# Patient Record
Sex: FEMALE | Race: WHITE | NOT HISPANIC OR LATINO | Employment: FULL TIME | URBAN - METROPOLITAN AREA
[De-identification: names, ages, dates, MRNs, and addresses within clinical notes are randomized per-mention and may not be internally consistent; named-entity substitution may affect disease eponyms.]

---

## 2023-08-02 ENCOUNTER — HOSPITAL ENCOUNTER (OUTPATIENT)
Dept: DATA CONVERSION | Facility: HOSPITAL | Age: 63
End: 2023-08-02
Attending: STUDENT IN AN ORGANIZED HEALTH CARE EDUCATION/TRAINING PROGRAM | Admitting: STUDENT IN AN ORGANIZED HEALTH CARE EDUCATION/TRAINING PROGRAM
Payer: COMMERCIAL

## 2023-08-02 DIAGNOSIS — K57.30 DIVERTICULOSIS OF LARGE INTESTINE WITHOUT PERFORATION OR ABSCESS WITHOUT BLEEDING: ICD-10-CM

## 2023-08-02 DIAGNOSIS — K63.5 POLYP OF COLON: ICD-10-CM

## 2023-08-02 DIAGNOSIS — Z12.11 ENCOUNTER FOR SCREENING FOR MALIGNANT NEOPLASM OF COLON: ICD-10-CM

## 2023-08-02 LAB — POCT GLUCOSE: 115 MG/DL (ref 74–99)

## 2023-08-07 LAB
COMPLETE PATHOLOGY REPORT: NORMAL
CONVERTED CLINICAL DIAGNOSIS-HISTORY: NORMAL
CONVERTED FINAL DIAGNOSIS: NORMAL
CONVERTED FINAL REPORT PDF LINK TO COPY AND PASTE: NORMAL
CONVERTED GROSS DESCRIPTION: NORMAL

## 2023-09-29 VITALS — HEIGHT: 61 IN | BODY MASS INDEX: 24.56 KG/M2 | WEIGHT: 130.07 LBS

## 2023-11-05 DIAGNOSIS — I10 ESSENTIAL (PRIMARY) HYPERTENSION: ICD-10-CM

## 2023-11-06 RX ORDER — HYDROCHLOROTHIAZIDE 25 MG/1
25 TABLET ORAL DAILY
Qty: 90 TABLET | Refills: 3 | Status: SHIPPED | OUTPATIENT
Start: 2023-11-06

## 2023-11-10 LAB
NON-UH HIE A/G RATIO: 1.2
NON-UH HIE ALB: 4.1 G/DL (ref 3.4–5)
NON-UH HIE ALK PHOS: 54 UNIT/L (ref 45–117)
NON-UH HIE BASO COUNT: 0.05 X1000 (ref 0–0.2)
NON-UH HIE BASOS %: 0.8 %
NON-UH HIE BILIRUBIN, TOTAL: 0.4 MG/DL (ref 0.3–1.2)
NON-UH HIE BUN/CREAT RATIO: 14.2
NON-UH HIE BUN: 17 MG/DL (ref 9–23)
NON-UH HIE CALCIUM: 10.1 MG/DL (ref 8.7–10.4)
NON-UH HIE CALCULATED LDL CHOLESTEROL: 98 MG/DL (ref 60–130)
NON-UH HIE CALCULATED OSMOLALITY: 286 MOSM/KG (ref 275–295)
NON-UH HIE CHLORIDE: 103 MMOL/L (ref 98–107)
NON-UH HIE CHOLESTEROL: 192 MG/DL (ref 100–200)
NON-UH HIE CO2, VENOUS: 28 MMOL/L (ref 20–31)
NON-UH HIE CREATININE: 1.2 MG/DL (ref 0.5–0.8)
NON-UH HIE DIFF?: NO
NON-UH HIE EOS COUNT: 0.1 X1000 (ref 0–0.5)
NON-UH HIE EOSIN %: 1.5 %
NON-UH HIE GFR AA: 55
NON-UH HIE GLOBULIN: 3.5 G/DL
NON-UH HIE GLOMERULAR FILTRATION RATE: 46 ML/MIN/1.73M?
NON-UH HIE GLUCOSE: 113 MG/DL (ref 74–106)
NON-UH HIE GOT: 17 UNIT/L (ref 15–37)
NON-UH HIE GPT: 22 UNIT/L (ref 10–49)
NON-UH HIE HCT: 38.8 % (ref 36–46)
NON-UH HIE HDL CHOLESTEROL: 43 MG/DL (ref 40–60)
NON-UH HIE HGB A1C: 6.6 %
NON-UH HIE HGB: 13 G/DL (ref 12–16)
NON-UH HIE INSTR WBC: 6.7
NON-UH HIE K: 4 MMOL/L (ref 3.5–5.1)
NON-UH HIE LYMPH %: 41.4 %
NON-UH HIE LYMPH COUNT: 2.76 X1000 (ref 1.2–4.8)
NON-UH HIE MCH: 30.4 PG (ref 27–34)
NON-UH HIE MCHC: 33.6 G/DL (ref 32–37)
NON-UH HIE MCV: 90.6 FL (ref 80–100)
NON-UH HIE MONO %: 5.2 %
NON-UH HIE MONO COUNT: 0.34 X1000 (ref 0.1–1)
NON-UH HIE MPV: 6.8 FL (ref 7.4–10.4)
NON-UH HIE NA: 142 MMOL/L (ref 135–145)
NON-UH HIE NEUTROPHIL %: 51.2 %
NON-UH HIE NEUTROPHIL COUNT (ANC): 3.42 X1000 (ref 1.4–8.8)
NON-UH HIE NUCLEATED RBC: 0 /100WBC
NON-UH HIE PLATELET: 412 X10 (ref 150–450)
NON-UH HIE RBC: 4.28 X10 (ref 4.2–5.4)
NON-UH HIE RDW: 13.5 % (ref 11.5–14.5)
NON-UH HIE TOTAL CHOL/HDL CHOL RATIO: 4.5
NON-UH HIE TOTAL PROTEIN: 7.6 G/DL (ref 5.7–8.2)
NON-UH HIE TRIGLYCERIDES: 257 MG/DL (ref 30–150)
NON-UH HIE WBC: 6.7 X10 (ref 4.5–11)

## 2023-11-27 ENCOUNTER — OFFICE VISIT (OUTPATIENT)
Dept: PRIMARY CARE | Facility: CLINIC | Age: 63
End: 2023-11-27
Payer: COMMERCIAL

## 2023-11-27 VITALS
HEART RATE: 56 BPM | SYSTOLIC BLOOD PRESSURE: 139 MMHG | DIASTOLIC BLOOD PRESSURE: 76 MMHG | HEIGHT: 61 IN | TEMPERATURE: 96.8 F | WEIGHT: 136.9 LBS | BODY MASS INDEX: 25.85 KG/M2

## 2023-11-27 DIAGNOSIS — E11.65 TYPE 2 DIABETES MELLITUS WITH HYPERGLYCEMIA, WITHOUT LONG-TERM CURRENT USE OF INSULIN (MULTI): Primary | ICD-10-CM

## 2023-11-27 DIAGNOSIS — I10 BENIGN ESSENTIAL HYPERTENSION: ICD-10-CM

## 2023-11-27 PROBLEM — N20.0 RECURRENT NEPHROLITHIASIS: Status: ACTIVE | Noted: 2023-11-27

## 2023-11-27 PROBLEM — R92.8 ABNORMAL FINDING ON BREAST IMAGING: Status: ACTIVE | Noted: 2023-11-27

## 2023-11-27 PROBLEM — D75.839 THROMBOCYTOSIS: Status: ACTIVE | Noted: 2023-11-27

## 2023-11-27 PROBLEM — U07.1 DISEASE DUE TO SEVERE ACUTE RESPIRATORY SYNDROME CORONAVIRUS 2 (SARS-COV-2): Status: RESOLVED | Noted: 2023-11-27 | Resolved: 2023-11-27

## 2023-11-27 PROBLEM — I73.00 RAYNAUD PHENOMENON: Status: ACTIVE | Noted: 2023-11-27

## 2023-11-27 PROBLEM — R73.01 IMPAIRED FASTING GLUCOSE: Status: ACTIVE | Noted: 2023-11-27

## 2023-11-27 PROBLEM — R20.2 PARESTHESIA OF BOTH HANDS: Status: ACTIVE | Noted: 2023-11-27

## 2023-11-27 PROBLEM — K63.5 POLYP OF COLON: Status: ACTIVE | Noted: 2023-08-02

## 2023-11-27 PROBLEM — U07.1 DISEASE DUE TO SEVERE ACUTE RESPIRATORY SYNDROME CORONAVIRUS 2 (SARS-COV-2): Status: ACTIVE | Noted: 2023-11-27

## 2023-11-27 PROBLEM — H91.90 HEARING LOSS: Status: ACTIVE | Noted: 2023-11-27

## 2023-11-27 PROBLEM — M85.80 OSTEOPENIA: Status: ACTIVE | Noted: 2017-05-09

## 2023-11-27 PROBLEM — R73.03 PREDIABETES: Chronic | Status: ACTIVE | Noted: 2017-05-09

## 2023-11-27 PROBLEM — R03.0 ELEVATED BLOOD PRESSURE READING WITHOUT DIAGNOSIS OF HYPERTENSION: Status: ACTIVE | Noted: 2023-11-27

## 2023-11-27 PROBLEM — R68.89 HEAT INTOLERANCE: Status: ACTIVE | Noted: 2023-11-27

## 2023-11-27 PROBLEM — K57.30 DIVERTICULOSIS OF LARGE INTESTINE: Status: ACTIVE | Noted: 2023-08-02

## 2023-11-27 PROCEDURE — 90471 IMMUNIZATION ADMIN: CPT | Performed by: INTERNAL MEDICINE

## 2023-11-27 PROCEDURE — 3075F SYST BP GE 130 - 139MM HG: CPT | Performed by: INTERNAL MEDICINE

## 2023-11-27 PROCEDURE — 99213 OFFICE O/P EST LOW 20 MIN: CPT | Performed by: INTERNAL MEDICINE

## 2023-11-27 PROCEDURE — 3078F DIAST BP <80 MM HG: CPT | Performed by: INTERNAL MEDICINE

## 2023-11-27 PROCEDURE — 90686 IIV4 VACC NO PRSV 0.5 ML IM: CPT | Performed by: INTERNAL MEDICINE

## 2023-11-27 PROCEDURE — 1036F TOBACCO NON-USER: CPT | Performed by: INTERNAL MEDICINE

## 2023-11-27 RX ORDER — BLOOD SUGAR DIAGNOSTIC
STRIP MISCELLANEOUS
Qty: 100 STRIP | Refills: 3 | Status: SHIPPED | OUTPATIENT
Start: 2023-11-27

## 2023-11-27 RX ORDER — METFORMIN HYDROCHLORIDE 1000 MG/1
1000 TABLET ORAL
Qty: 180 TABLET | Refills: 3 | Status: SHIPPED | OUTPATIENT
Start: 2023-11-27 | End: 2024-11-26

## 2023-11-27 RX ORDER — DEXTROSE 4 G
TABLET,CHEWABLE ORAL
Qty: 1 EACH | Refills: 0 | Status: SHIPPED | OUTPATIENT
Start: 2023-11-27

## 2023-11-27 RX ORDER — MULTIVITAMIN
1 TABLET ORAL
COMMUNITY
Start: 2010-09-09

## 2023-11-27 RX ORDER — METFORMIN HYDROCHLORIDE 500 MG/1
500 TABLET ORAL
COMMUNITY
Start: 2018-03-12 | End: 2023-11-27 | Stop reason: SDUPTHER

## 2023-11-27 RX ORDER — LANCETS 33 GAUGE
EACH MISCELLANEOUS
Qty: 100 EACH | Refills: 3 | Status: SHIPPED | OUTPATIENT
Start: 2023-11-27

## 2023-11-27 RX ORDER — GUSELKUMAB 100 MG/ML
100 INJECTION SUBCUTANEOUS
COMMUNITY
Start: 2021-01-07

## 2023-11-27 RX ORDER — LOVASTATIN 40 MG/1
40 TABLET ORAL
COMMUNITY
Start: 2020-01-13 | End: 2024-01-10

## 2023-11-27 ASSESSMENT — PATIENT HEALTH QUESTIONNAIRE - PHQ9
1. LITTLE INTEREST OR PLEASURE IN DOING THINGS: NOT AT ALL
2. FEELING DOWN, DEPRESSED OR HOPELESS: NOT AT ALL
SUM OF ALL RESPONSES TO PHQ9 QUESTIONS 1 AND 2: 0

## 2023-11-27 NOTE — PROGRESS NOTES
"Subjective   Patient ID: Lanette Hannon is a 62 y.o. female who presents for Follow-up (Lab follow up). New diagnosis of diabetes.    HPI     History reviewed and updated.   Labs done 11/10/23 showed A1c 6.6 - new diagnosis of diabetes.  No new complaints today.  Feels like she's been under more stress lately, change in diet, not as healthy.    Would like flu shot today.    Review of Systems   Constitutional:  Negative for chills, fatigue and fever.   HENT:  Negative for sore throat.    Respiratory:  Negative for cough and shortness of breath.    Cardiovascular:  Negative for chest pain, palpitations and leg swelling.   Gastrointestinal:  Negative for abdominal pain, constipation, diarrhea, nausea and vomiting.   Genitourinary:  Negative for dysuria and hematuria.   Musculoskeletal:  Negative for arthralgias, back pain and gait problem.   Skin:  Negative for rash.   Neurological:  Negative for dizziness, light-headedness and headaches.   Psychiatric/Behavioral:  Negative for confusion.        Objective   /76   Pulse 56   Temp 36 °C (96.8 °F) (Temporal)   Ht 1.549 m (5' 1\")   Wt 62.1 kg (136 lb 14.4 oz)   BMI 25.87 kg/m²     Physical Exam  Constitutional:       Appearance: Normal appearance.   HENT:      Head: Normocephalic and atraumatic.   Cardiovascular:      Rate and Rhythm: Normal rate and regular rhythm.      Heart sounds: No murmur heard.  Pulmonary:      Effort: Pulmonary effort is normal. No respiratory distress.      Breath sounds: Normal breath sounds. No wheezing.   Abdominal:      General: There is no distension.      Palpations: Abdomen is soft.      Tenderness: There is no abdominal tenderness.   Musculoskeletal:      Right lower leg: No edema.      Left lower leg: No edema.   Skin:     Findings: No rash.   Neurological:      General: No focal deficit present.      Mental Status: She is alert and oriented to person, place, and time. Mental status is at baseline.   Psychiatric:         Mood and " Affect: Mood normal.         Behavior: Behavior normal.         Thought Content: Thought content normal.         Judgment: Judgment normal.         Assessment/Plan   Problem List Items Addressed This Visit             ICD-10-CM    Benign essential hypertension I10    Type 2 diabetes mellitus with hyperglycemia, without long-term current use of insulin (CMS/[facility]) - Primary E11.65    Relevant Medications    metFORMIN (Glucophage) 1,000 mg tablet    blood-glucose meter (OneTouch Ultra2 Meter) misc    OneTouch Ultra Test strip    lancets (OneTouch Delica Plus Lancet) 33 gauge misc     DM2 - new diagnosis.  Declines referral for diabetes education at this time.  Discussed importance of healthy diet and regular exercise.  Try to cut back on simple carbs (flour, sugar), rice, potatoes.  Has been on metformin 500 mg BID for years as a preventative, will now increase to 1000 mg twice daily.  Rx sent for glucometer and supplies.  Follow-up in 3 months.    Hypertension - at initial goal, continue current medications.    Flu shot today.    Follow-up 3 months and PRN.

## 2023-11-29 ASSESSMENT — ENCOUNTER SYMPTOMS
VOMITING: 0
COUGH: 0
DYSURIA: 0
HEMATURIA: 0
PALPITATIONS: 0
DIZZINESS: 0
ABDOMINAL PAIN: 0
ARTHRALGIAS: 0
DIARRHEA: 0
HEADACHES: 0
LIGHT-HEADEDNESS: 0
BACK PAIN: 0
NAUSEA: 0
CONFUSION: 0
CHILLS: 0
SORE THROAT: 0
FATIGUE: 0
CONSTIPATION: 0
FEVER: 0
SHORTNESS OF BREATH: 0

## 2024-01-10 DIAGNOSIS — E78.2 MIXED HYPERLIPIDEMIA: ICD-10-CM

## 2024-01-10 RX ORDER — LOVASTATIN 40 MG/1
40 TABLET ORAL
Qty: 90 TABLET | Refills: 3 | Status: SHIPPED | OUTPATIENT
Start: 2024-01-10

## 2024-02-27 ENCOUNTER — OFFICE VISIT (OUTPATIENT)
Dept: PRIMARY CARE | Facility: CLINIC | Age: 64
End: 2024-02-27
Payer: COMMERCIAL

## 2024-02-27 VITALS
WEIGHT: 132.4 LBS | DIASTOLIC BLOOD PRESSURE: 66 MMHG | BODY MASS INDEX: 25 KG/M2 | OXYGEN SATURATION: 98 % | HEART RATE: 61 BPM | TEMPERATURE: 97.2 F | SYSTOLIC BLOOD PRESSURE: 114 MMHG | HEIGHT: 61 IN

## 2024-02-27 DIAGNOSIS — E11.65 TYPE 2 DIABETES MELLITUS WITH HYPERGLYCEMIA, WITHOUT LONG-TERM CURRENT USE OF INSULIN (MULTI): Primary | ICD-10-CM

## 2024-02-27 DIAGNOSIS — R20.0 RIGHT UPPER EXTREMITY NUMBNESS: ICD-10-CM

## 2024-02-27 DIAGNOSIS — I10 BENIGN ESSENTIAL HYPERTENSION: ICD-10-CM

## 2024-02-27 PROCEDURE — 99214 OFFICE O/P EST MOD 30 MIN: CPT | Performed by: INTERNAL MEDICINE

## 2024-02-27 PROCEDURE — 1036F TOBACCO NON-USER: CPT | Performed by: INTERNAL MEDICINE

## 2024-02-27 PROCEDURE — 3078F DIAST BP <80 MM HG: CPT | Performed by: INTERNAL MEDICINE

## 2024-02-27 PROCEDURE — 3074F SYST BP LT 130 MM HG: CPT | Performed by: INTERNAL MEDICINE

## 2024-02-27 PROCEDURE — 83036 HEMOGLOBIN GLYCOSYLATED A1C: CPT | Performed by: INTERNAL MEDICINE

## 2024-02-27 ASSESSMENT — PATIENT HEALTH QUESTIONNAIRE - PHQ9
1. LITTLE INTEREST OR PLEASURE IN DOING THINGS: NOT AT ALL
SUM OF ALL RESPONSES TO PHQ9 QUESTIONS 1 AND 2: 0
2. FEELING DOWN, DEPRESSED OR HOPELESS: NOT AT ALL

## 2024-02-27 NOTE — PROGRESS NOTES
CHIEF COMPLAINT  Diabetes    HISTORY OF PRESENT ILLNESS  Lanette Hannon is a 63 y.o. female presents today for follow up of Diabetes    HPI    Had some GI distress /loose stool with increased dose of metformin.   Does not typically eat breakfast, so when she takes the AM dose, it's usually on an empty stomach.  Blood sugar has been well controlled.  Working on healthy habits, lost a few lbs.     Right arm numbness  Left handed  Sometimes falls asleep when watching TV or when sleeping.  Occurs at rest.  She sews quite a bit - uses both arms for sewing - arms stretched in front of her on the sewing table.      REVIEW OF SYSTEMS  Review of Systems   Constitutional:  Negative for chills, fatigue and fever.   HENT:  Negative for sore throat.    Respiratory:  Negative for cough and shortness of breath.    Cardiovascular:  Negative for chest pain, palpitations and leg swelling.   Gastrointestinal:  Negative for abdominal pain, constipation, nausea and vomiting.        See HPI re: metformin    Genitourinary:  Negative for dysuria and hematuria.   Musculoskeletal:  Negative for arthralgias, back pain and gait problem.   Skin:  Negative for rash.   Neurological:  Negative for dizziness, light-headedness and headaches.   Psychiatric/Behavioral:  Negative for confusion.        ALLERGIES  Patient has no known allergies.    MEDICATIONS  Current Outpatient Medications   Medication Instructions    blood-glucose meter (OneTouch Ultra2 Meter) misc Use to check blood sugar.    hydroCHLOROthiazide (HYDRODIURIL) 25 mg, oral, Daily    lancets (OneTouch Delica Plus Lancet) 33 gauge misc Check fasting blood sugar once daily.    lovastatin (MEVACOR) 40 mg, oral, Daily with evening meal    metFORMIN (GLUCOPHAGE) 1,000 mg, oral, 2 times daily with meals    multivitamin tablet 1 tablet, oral    OneTouch Ultra Test strip Check fasting glucose daily.    Tremfya 100 mg, subcutaneous, Every 8 weeks       TOBACCO USE  Social History     Tobacco Use  "  Smoking Status Never   Smokeless Tobacco Never       DEPRESSION SCREEN  Over the past 2 weeks, how often have you been bothered by any of the following problems?  Little interest or pleasure in doing things: Not at all  Feeling down, depressed, or hopeless: Not at all    SURGICAL HISTORY  Past Surgical History:  01/03/2019: OTHER SURGICAL HISTORY      Comment:  Exploratory laparoscopy  01/03/2019: OTHER SURGICAL HISTORY      Comment:  Breast biopsy  01/14/2021: OTHER SURGICAL HISTORY      Comment:  Complete colonoscopy       OBJECTIVE    /66   Pulse 61   Temp 36.2 °C (97.2 °F)   Ht 1.549 m (5' 1\")   Wt 60.1 kg (132 lb 6.4 oz)   SpO2 98%   BMI 25.02 kg/m²    BMI: Estimated body mass index is 25.02 kg/m² as calculated from the following:    Height as of this encounter: 1.549 m (5' 1\").    Weight as of this encounter: 60.1 kg (132 lb 6.4 oz).    BP Readings from Last 3 Encounters:   02/27/24 114/66   11/27/23 139/76   07/10/23 114/68      Wt Readings from Last 3 Encounters:   02/27/24 60.1 kg (132 lb 6.4 oz)   11/27/23 62.1 kg (136 lb 14.4 oz)   07/10/23 59.1 kg (130 lb 3 oz)      Lab Results   Component Value Date    HGBA1C 6.3 02/28/2024         PHYSICAL EXAM  Physical Exam  Constitutional:       Appearance: Normal appearance.   HENT:      Head: Normocephalic and atraumatic.   Cardiovascular:      Rate and Rhythm: Normal rate and regular rhythm.      Pulses: Normal pulses.      Heart sounds: No murmur heard.  Pulmonary:      Effort: Pulmonary effort is normal. No respiratory distress.      Breath sounds: Normal breath sounds. No wheezing.   Abdominal:      General: There is no distension.      Palpations: Abdomen is soft.      Tenderness: There is no abdominal tenderness.   Musculoskeletal:      Right lower leg: No edema.      Left lower leg: No edema.   Neurological:      General: No focal deficit present.      Mental Status: She is alert and oriented to person, place, and time. Mental status is at " baseline.   Psychiatric:         Mood and Affect: Mood normal.         Behavior: Behavior normal.         Thought Content: Thought content normal.         Judgment: Judgment normal.          ASSESSMENT AND PLAN  Assessment/Plan   Problem List Items Addressed This Visit       Benign essential hypertension    Type 2 diabetes mellitus with hyperglycemia, without long-term current use of insulin (CMS/[facility]) - Primary    Relevant Orders    POCT glycosylated hemoglobin (Hb A1C) manually resulted (Completed)    Right upper extremity numbness     DM2 - A1c is good today at 6.3%.  Should take metformin with food.  She can also cut back morning dose to 500 mg if she tends not to eat very much in AM.  Continue healthy habits and once daily home monitoring.    Hypertension - well controlled, continue current medication.    Right arm numbness - positional, tends to occur while sleeping and while inactive.  I suspect she has nerve compression, possibly ulnar and median nerve.  Try to avoid prolonged flexion at elbow and resting elbow on surfaces.  (Such as when sewing, watching TV).  Can also try wearing wrist splint while sleeping.  If no improvement, I can order EMG/NCV.    Follow-up in 3 months.

## 2024-02-28 PROBLEM — R20.0 RIGHT UPPER EXTREMITY NUMBNESS: Status: ACTIVE | Noted: 2024-02-28

## 2024-02-28 LAB — POC HEMOGLOBIN A1C: 6.3 % (ref 4.2–6.5)

## 2024-02-28 ASSESSMENT — ENCOUNTER SYMPTOMS
HEADACHES: 0
DYSURIA: 0
ARTHRALGIAS: 0
CONSTIPATION: 0
NAUSEA: 0
VOMITING: 0
ABDOMINAL PAIN: 0
COUGH: 0
FATIGUE: 0
PALPITATIONS: 0
FEVER: 0
LIGHT-HEADEDNESS: 0
SORE THROAT: 0
SHORTNESS OF BREATH: 0
BACK PAIN: 0
HEMATURIA: 0
CONFUSION: 0
CHILLS: 0
DIZZINESS: 0

## 2024-04-15 DIAGNOSIS — R73.01 IMPAIRED FASTING GLUCOSE: ICD-10-CM

## 2024-04-15 RX ORDER — METFORMIN HYDROCHLORIDE 500 MG/1
500 TABLET ORAL DAILY
Qty: 90 TABLET | Refills: 3 | Status: SHIPPED | OUTPATIENT
Start: 2024-04-15

## 2024-07-10 ENCOUNTER — APPOINTMENT (OUTPATIENT)
Dept: PRIMARY CARE | Facility: CLINIC | Age: 64
End: 2024-07-10
Payer: COMMERCIAL

## 2024-07-10 VITALS
WEIGHT: 134.1 LBS | SYSTOLIC BLOOD PRESSURE: 96 MMHG | OXYGEN SATURATION: 98 % | HEART RATE: 95 BPM | BODY MASS INDEX: 25.32 KG/M2 | HEIGHT: 61 IN | DIASTOLIC BLOOD PRESSURE: 65 MMHG | TEMPERATURE: 97.1 F

## 2024-07-10 DIAGNOSIS — I10 BENIGN ESSENTIAL HYPERTENSION: ICD-10-CM

## 2024-07-10 DIAGNOSIS — E11.65 TYPE 2 DIABETES MELLITUS WITH HYPERGLYCEMIA, WITHOUT LONG-TERM CURRENT USE OF INSULIN (MULTI): Primary | ICD-10-CM

## 2024-07-10 DIAGNOSIS — I65.23 CAROTID STENOSIS, ASYMPTOMATIC, BILATERAL: ICD-10-CM

## 2024-07-10 PROCEDURE — 3078F DIAST BP <80 MM HG: CPT | Performed by: INTERNAL MEDICINE

## 2024-07-10 PROCEDURE — 99213 OFFICE O/P EST LOW 20 MIN: CPT | Performed by: INTERNAL MEDICINE

## 2024-07-10 PROCEDURE — 3074F SYST BP LT 130 MM HG: CPT | Performed by: INTERNAL MEDICINE

## 2024-07-10 RX ORDER — BLOOD SUGAR DIAGNOSTIC
STRIP MISCELLANEOUS
Qty: 100 STRIP | Refills: 3 | Status: SHIPPED | OUTPATIENT
Start: 2024-07-10

## 2024-07-10 NOTE — PROGRESS NOTES
CHIEF COMPLAINT  Follow-up (90 day follow-up DM, HTN)    HISTORY OF PRESENT ILLNESS  Lanette Hannon is a 63 y.o. female presents today for follow up of Follow-up (90 day follow-up DM, HTN)    HPI    [facility] screen - showed mild plaque in bilateral carotid arteries, otherwise unremarkable. (Scanned to chart).  Doing well on current medications.  She still sometimes has GI side effects with the higher dose of metformin.      REVIEW OF SYSTEMS  Review of Systems   Constitutional:  Negative for chills, fatigue and fever.   HENT:  Negative for sore throat.    Respiratory:  Negative for cough and shortness of breath.    Cardiovascular:  Negative for chest pain, palpitations and leg swelling.   Gastrointestinal:  Positive for diarrhea. Negative for constipation, nausea and vomiting.   Genitourinary:  Negative for dysuria and hematuria.   Musculoskeletal:  Negative for arthralgias, back pain and gait problem.   Skin:  Negative for rash.   Neurological:  Negative for dizziness, light-headedness and headaches.   Psychiatric/Behavioral:  Negative for confusion.        ALLERGIES  Patient has no known allergies.    MEDICATIONS  Current Outpatient Medications   Medication Instructions    blood-glucose meter (OneTouch Ultra2 Meter) misc Use to check blood sugar.    hydroCHLOROthiazide (HYDRODIURIL) 25 mg, oral, Daily    lancets (OneTouch Delica Plus Lancet) 33 gauge misc Check fasting blood sugar once daily.    lovastatin (MEVACOR) 40 mg, oral, Daily with evening meal    metFORMIN (GLUCOPHAGE) 1,000 mg, oral, 2 times daily (morning and late afternoon)    multivitamin tablet 1 tablet, oral    OneTouch Ultra Test strip Check fasting glucose daily.    Tremfya 100 mg, subcutaneous, Every 8 weeks       TOBACCO USE  Social History     Tobacco Use   Smoking Status Never   Smokeless Tobacco Never       DEPRESSION SCREEN  Over the past 2 weeks, how often have you been bothered by any of the following problems?  Little interest or pleasure in  "doing things: Not at all  Feeling down, depressed, or hopeless: Not at all    SURGICAL HISTORY  Past Surgical History:  01/03/2019: OTHER SURGICAL HISTORY      Comment:  Exploratory laparoscopy  01/03/2019: OTHER SURGICAL HISTORY      Comment:  Breast biopsy  01/14/2021: OTHER SURGICAL HISTORY      Comment:  Complete colonoscopy       OBJECTIVE    BP 96/65 (BP Location: Left arm, Patient Position: Sitting, BP Cuff Size: Adult)   Pulse 95   Temp 36.2 °C (97.1 °F) (Temporal)   Ht 1.549 m (5' 1\")   Wt 60.8 kg (134 lb 1.6 oz)   SpO2 98%   BMI 25.34 kg/m²    BMI: Estimated body mass index is 25.34 kg/m² as calculated from the following:    Height as of this encounter: 1.549 m (5' 1\").    Weight as of this encounter: 60.8 kg (134 lb 1.6 oz).    BP Readings from Last 3 Encounters:   07/10/24 96/65   02/27/24 114/66   11/27/23 139/76      Wt Readings from Last 3 Encounters:   07/10/24 60.8 kg (134 lb 1.6 oz)   02/27/24 60.1 kg (132 lb 6.4 oz)   11/27/23 62.1 kg (136 lb 14.4 oz)        PHYSICAL EXAM  Physical Exam  Constitutional:       Appearance: Normal appearance.   HENT:      Head: Normocephalic and atraumatic.   Neck:      Vascular: No carotid bruit.   Cardiovascular:      Rate and Rhythm: Normal rate and regular rhythm.      Pulses: Normal pulses.      Heart sounds: No murmur heard.  Pulmonary:      Effort: Pulmonary effort is normal. No respiratory distress.      Breath sounds: Normal breath sounds. No wheezing.   Abdominal:      General: There is no distension.      Palpations: Abdomen is soft.      Tenderness: There is no abdominal tenderness.   Musculoskeletal:      Right lower leg: No edema.      Left lower leg: No edema.   Neurological:      General: No focal deficit present.      Mental Status: She is alert and oriented to person, place, and time. Mental status is at baseline.   Psychiatric:         Mood and Affect: Mood normal.         Behavior: Behavior normal.         Thought Content: Thought content " normal.         Judgment: Judgment normal.            ASSESSMENT AND PLAN  Assessment/Plan   Problem List Items Addressed This Visit       Benign essential hypertension    Type 2 diabetes mellitus with hyperglycemia, without long-term current use of insulin ([facility]) - Primary    Relevant Medications    OneTouch Ultra Test strip    Carotid stenosis, asymptomatic, bilateral    Overview     Mild per [employer] screening 2024, plan to repeat in 2-3 years.          DM2 - has been well controlled.  Continue current medication.  If effects from metformin 1000 mg BID fail to improve after a few weeks, then reduce dose back down to 500 mg BID.  Continue with healthy habits.    Hypertension - at goal, continue current medication.    Carotid stenosis, mild per [facility] screen - continue with risk factor modification.  Repeat Carotid Ultrasound in 2-3 years.    Follow-up 6 months, will be due for full fasting labs at that time.

## 2024-07-12 ASSESSMENT — ENCOUNTER SYMPTOMS
COUGH: 0
DYSURIA: 0
DIZZINESS: 0
FATIGUE: 0
CHILLS: 0
NAUSEA: 0
VOMITING: 0
HEADACHES: 0
SHORTNESS OF BREATH: 0
BACK PAIN: 0
LIGHT-HEADEDNESS: 0
FEVER: 0
CONSTIPATION: 0
CONFUSION: 0
HEMATURIA: 0
ARTHRALGIAS: 0
DIARRHEA: 1
PALPITATIONS: 0
SORE THROAT: 0

## 2024-09-30 DIAGNOSIS — E11.65 TYPE 2 DIABETES MELLITUS WITH HYPERGLYCEMIA, WITHOUT LONG-TERM CURRENT USE OF INSULIN: ICD-10-CM

## 2024-09-30 DIAGNOSIS — I10 ESSENTIAL (PRIMARY) HYPERTENSION: ICD-10-CM

## 2024-09-30 RX ORDER — HYDROCHLOROTHIAZIDE 25 MG/1
25 TABLET ORAL DAILY
Qty: 90 TABLET | Refills: 3 | Status: SHIPPED | OUTPATIENT
Start: 2024-09-30

## 2024-09-30 RX ORDER — METFORMIN HYDROCHLORIDE 1000 MG/1
1000 TABLET ORAL
Qty: 180 TABLET | Refills: 3 | Status: SHIPPED | OUTPATIENT
Start: 2024-09-30

## 2024-11-02 DIAGNOSIS — Z12.31 VISIT FOR SCREENING MAMMOGRAM: Primary | ICD-10-CM

## 2024-12-20 ENCOUNTER — HOSPITAL ENCOUNTER (OUTPATIENT)
Dept: RADIOLOGY | Facility: CLINIC | Age: 64
Discharge: HOME | End: 2024-12-20
Payer: COMMERCIAL

## 2024-12-20 VITALS — WEIGHT: 134.04 LBS | HEIGHT: 61 IN | BODY MASS INDEX: 25.31 KG/M2

## 2024-12-20 DIAGNOSIS — Z12.31 VISIT FOR SCREENING MAMMOGRAM: ICD-10-CM

## 2024-12-20 PROCEDURE — 77063 BREAST TOMOSYNTHESIS BI: CPT

## 2024-12-27 ENCOUNTER — HOSPITAL ENCOUNTER (OUTPATIENT)
Dept: RADIOLOGY | Facility: EXTERNAL LOCATION | Age: 64
Discharge: HOME | End: 2024-12-27

## 2024-12-27 DIAGNOSIS — E78.2 MIXED HYPERLIPIDEMIA: ICD-10-CM

## 2024-12-27 RX ORDER — LOVASTATIN 40 MG/1
40 TABLET ORAL
Qty: 90 TABLET | Refills: 3 | Status: SHIPPED | OUTPATIENT
Start: 2024-12-27

## 2024-12-31 ENCOUNTER — TELEPHONE (OUTPATIENT)
Dept: PRIMARY CARE | Facility: CLINIC | Age: 64
End: 2024-12-31
Payer: COMMERCIAL

## 2025-01-09 ENCOUNTER — APPOINTMENT (OUTPATIENT)
Dept: PRIMARY CARE | Facility: CLINIC | Age: 65
End: 2025-01-09
Payer: COMMERCIAL

## 2025-01-09 VITALS
HEART RATE: 80 BPM | DIASTOLIC BLOOD PRESSURE: 66 MMHG | HEIGHT: 60 IN | TEMPERATURE: 96.9 F | SYSTOLIC BLOOD PRESSURE: 106 MMHG | OXYGEN SATURATION: 97 % | WEIGHT: 136.2 LBS | BODY MASS INDEX: 26.74 KG/M2

## 2025-01-09 DIAGNOSIS — E11.65 TYPE 2 DIABETES MELLITUS WITH HYPERGLYCEMIA, WITHOUT LONG-TERM CURRENT USE OF INSULIN: ICD-10-CM

## 2025-01-09 DIAGNOSIS — I10 BENIGN ESSENTIAL HYPERTENSION: Primary | ICD-10-CM

## 2025-01-09 DIAGNOSIS — E78.2 MIXED HYPERLIPIDEMIA: Chronic | ICD-10-CM

## 2025-01-09 PROCEDURE — 3078F DIAST BP <80 MM HG: CPT | Performed by: INTERNAL MEDICINE

## 2025-01-09 PROCEDURE — 1036F TOBACCO NON-USER: CPT | Performed by: INTERNAL MEDICINE

## 2025-01-09 PROCEDURE — 3008F BODY MASS INDEX DOCD: CPT | Performed by: INTERNAL MEDICINE

## 2025-01-09 PROCEDURE — 99214 OFFICE O/P EST MOD 30 MIN: CPT | Performed by: INTERNAL MEDICINE

## 2025-01-09 PROCEDURE — 3074F SYST BP LT 130 MM HG: CPT | Performed by: INTERNAL MEDICINE

## 2025-01-09 ASSESSMENT — ENCOUNTER SYMPTOMS
FEVER: 0
COUGH: 0
PALPITATIONS: 0
HEMATURIA: 0
LIGHT-HEADEDNESS: 0
CHILLS: 0
FATIGUE: 0
DYSURIA: 0
DIARRHEA: 0
HEADACHES: 0
ARTHRALGIAS: 0
SORE THROAT: 0
DIZZINESS: 0
NAUSEA: 0
CONSTIPATION: 0
CONFUSION: 0
VOMITING: 0
BACK PAIN: 0
SHORTNESS OF BREATH: 0

## 2025-01-09 NOTE — PROGRESS NOTES
CHIEF COMPLAINT  Diabetes    HISTORY OF PRESENT ILLNESS  Lanette Hannon is a 64 y.o. female presents today for follow up of Diabetes    HPI    History reviewed and updated.  Recent bout of shingles, nearly resolved.  Has not been checking blood sugar lately, as it had been steady and controlled when she was checking.  Sees derm for psoriasis, has 100% relief with Tremfya.    REVIEW OF SYSTEMS  Review of Systems   Constitutional:  Negative for chills, fatigue and fever.   HENT:  Negative for sore throat.    Respiratory:  Negative for cough and shortness of breath.    Cardiovascular:  Negative for chest pain, palpitations and leg swelling.   Gastrointestinal:  Negative for constipation, diarrhea, nausea and vomiting.   Genitourinary:  Negative for dysuria and hematuria.   Musculoskeletal:  Negative for arthralgias, back pain and gait problem.   Skin:  Negative for rash.   Neurological:  Negative for dizziness, light-headedness and headaches.   Psychiatric/Behavioral:  Negative for confusion.        ALLERGIES  Patient has no known allergies.    MEDICATIONS  Current Outpatient Medications   Medication Instructions    blood-glucose meter (OneTouch Ultra2 Meter) misc Use to check blood sugar.    hydroCHLOROthiazide (HYDRODIURIL) 25 mg, oral, Daily    lancets (OneTouch Delica Plus Lancet) 33 gauge misc Check fasting blood sugar once daily.    lovastatin (MEVACOR) 40 mg, oral, Daily with evening meal    metFORMIN (GLUCOPHAGE) 1,000 mg, oral, 2 times daily (morning and late afternoon)    multivitamin tablet 1 tablet    OneTouch Ultra Test strip Check fasting glucose daily.    Tremfya 100 mg, Every 8 weeks       TOBACCO USE  Social History     Tobacco Use   Smoking Status Never   Smokeless Tobacco Never       DEPRESSION SCREEN  Over the past 2 weeks, how often have you been bothered by any of the following problems?  Little interest or pleasure in doing things: Not at all  Feeling down, depressed, or hopeless: Not at  "all    SURGICAL HISTORY  Past Surgical History:  01/03/2019: OTHER SURGICAL HISTORY      Comment:  Exploratory laparoscopy  01/03/2019: OTHER SURGICAL HISTORY      Comment:  Breast biopsy  01/14/2021: OTHER SURGICAL HISTORY      Comment:  Complete colonoscopy       OBJECTIVE    /66   Pulse 80   Temp 36.1 °C (96.9 °F)   Ht 1.527 m (5' 0.1\")   Wt 61.8 kg (136 lb 3.2 oz)   SpO2 97%   BMI 26.51 kg/m²    BMI: Estimated body mass index is 26.51 kg/m² as calculated from the following:    Height as of this encounter: 1.527 m (5' 0.1\").    Weight as of this encounter: 61.8 kg (136 lb 3.2 oz).    BP Readings from Last 3 Encounters:   01/09/25 106/66   07/10/24 96/65   02/27/24 114/66      Wt Readings from Last 3 Encounters:   01/09/25 61.8 kg (136 lb 3.2 oz)   12/20/24 60.8 kg (134 lb 0.6 oz)   07/10/24 60.8 kg (134 lb 1.6 oz)        PHYSICAL EXAM  Physical Exam  Constitutional:       Appearance: Normal appearance.   HENT:      Head: Normocephalic and atraumatic.   Cardiovascular:      Rate and Rhythm: Normal rate and regular rhythm.      Heart sounds: No murmur heard.  Pulmonary:      Effort: Pulmonary effort is normal. No respiratory distress.      Breath sounds: Normal breath sounds. No wheezing.   Abdominal:      General: There is no distension.      Palpations: Abdomen is soft.      Tenderness: There is no abdominal tenderness.   Musculoskeletal:      Right lower leg: No edema.      Left lower leg: No edema.   Neurological:      General: No focal deficit present.      Mental Status: She is alert and oriented to person, place, and time. Mental status is at baseline.   Psychiatric:         Mood and Affect: Mood normal.         Behavior: Behavior normal.         Thought Content: Thought content normal.         Judgment: Judgment normal.       ASSESSMENT AND PLAN  Assessment/Plan   Problem List Items Addressed This Visit       Benign essential hypertension - Primary    Relevant Orders    Comprehensive Metabolic " Panel    CBC and Auto Differential    Mixed hyperlipidemia (Chronic)    Relevant Orders    Lipid Panel    Type 2 diabetes mellitus with hyperglycemia, without long-term current use of insulin    Relevant Orders    Hemoglobin A1c    Albumin-Creatinine Ratio, Urine Random     DM2 - Check A1c.  Continue current medication.  Try to monitor blood sugar periodically at home.  Continue with healthy habits.  - on statin     Hypertension - at goal, continue current medication.    Hyperlipidemia - continue statin.    Routine fasting labs - CBC, CMP, FLP.     Follow-up 6 months for well visit.

## 2025-02-22 LAB
NON-UH HIE A/G RATIO: 1
NON-UH HIE ALB: 3.9 G/DL (ref 3.4–5)
NON-UH HIE ALK PHOS: 51 UNIT/L (ref 45–117)
NON-UH HIE BASO COUNT: 0.04 X1000 (ref 0–0.2)
NON-UH HIE BASOS %: 0.7 %
NON-UH HIE BILIRUBIN, TOTAL: 0.4 MG/DL (ref 0.3–1.2)
NON-UH HIE BUN/CREAT RATIO: 15.7
NON-UH HIE BUN: 22 MG/DL (ref 9–23)
NON-UH HIE CALCIUM: 10.4 MG/DL (ref 8.7–10.4)
NON-UH HIE CALCULATED LDL CHOLESTEROL: 93 MG/DL (ref 60–130)
NON-UH HIE CALCULATED OSMOLALITY: 285 MOSM/KG (ref 275–295)
NON-UH HIE CHLORIDE: 103 MMOL/L (ref 98–107)
NON-UH HIE CHOLESTEROL: 181 MG/DL (ref 100–200)
NON-UH HIE CO2, VENOUS: 29 MMOL/L (ref 20–31)
NON-UH HIE CREATININE: 1.4 MG/DL (ref 0.5–0.8)
NON-UH HIE DIFF?: ABNORMAL
NON-UH HIE EOS COUNT: 0.13 X1000 (ref 0–0.5)
NON-UH HIE EOSIN %: 2.1 %
NON-UH HIE GFR AA: 46
NON-UH HIE GLOBULIN: 3.8 G/DL
NON-UH HIE GLOMERULAR FILTRATION RATE: 38 ML/MIN/1.73M?
NON-UH HIE GLUCOSE: 109 MG/DL (ref 74–106)
NON-UH HIE GOT: 13 UNIT/L (ref 15–37)
NON-UH HIE GPT: 19 UNIT/L (ref 10–49)
NON-UH HIE HCT: 38.4 % (ref 36–46)
NON-UH HIE HDL CHOLESTEROL: 37 MG/DL (ref 40–60)
NON-UH HIE HGB A1C: 6.4 %
NON-UH HIE HGB: 13 G/DL (ref 12–16)
NON-UH HIE INSTR WBC: 6.2
NON-UH HIE K: 4 MMOL/L (ref 3.5–5.1)
NON-UH HIE LYMPH %: 40.2 %
NON-UH HIE LYMPH COUNT: 2.5 X1000 (ref 1.2–4.8)
NON-UH HIE MCH: 30.5 PG (ref 27–34)
NON-UH HIE MCHC: 33.8 G/DL (ref 32–37)
NON-UH HIE MCV: 90.3 FL (ref 80–100)
NON-UH HIE MONO %: 5.1 %
NON-UH HIE MONO COUNT: 0.32 X1000 (ref 0.1–1)
NON-UH HIE MPV: 7 FL (ref 7.4–10.4)
NON-UH HIE NA: 141 MMOL/L (ref 135–145)
NON-UH HIE NEUTROPHIL %: 51.9 %
NON-UH HIE NEUTROPHIL COUNT (ANC): 3.23 X1000 (ref 1.4–8.8)
NON-UH HIE NUCLEATED RBC: 0 /100WBC
NON-UH HIE PLATELET: 385 X10 (ref 150–450)
NON-UH HIE RBC: 4.25 X10 (ref 4.2–5.4)
NON-UH HIE RDW: 14.2 % (ref 11.5–14.5)
NON-UH HIE TOTAL CHOL/HDL CHOL RATIO: 4.9
NON-UH HIE TOTAL PROTEIN: 7.7 G/DL (ref 5.7–8.2)
NON-UH HIE TRIGLYCERIDES: 257 MG/DL (ref 30–150)
NON-UH HIE WBC: 6.2 X10 (ref 4.5–11)

## 2025-02-24 ENCOUNTER — TELEPHONE (OUTPATIENT)
Dept: PRIMARY CARE | Facility: CLINIC | Age: 65
End: 2025-02-24
Payer: COMMERCIAL

## 2025-02-24 NOTE — TELEPHONE ENCOUNTER
----- Message from Barbi Blum sent at 2/23/2025  9:32 AM EST -----  Please advise creatinine is up from last year (decline in kidney function).  Should stop hydrochlorothiazide and schedule appointment for the next 1-2 weeks.

## 2025-02-24 NOTE — TELEPHONE ENCOUNTER
Called patient and left a message on patient's voicemail to call our office regarding lab results.

## 2025-02-27 NOTE — TELEPHONE ENCOUNTER
----- Message from Barbi Blum sent at 12/31/2024  2:43 AM EST -----  Please advise mammogram is normal.  Continue annual screening.

## 2025-02-27 NOTE — TELEPHONE ENCOUNTER
I called patient and read to patient Dr. Blum results message.  Patient states she spoke with someone yesterday she thought they said Dr. Hernandez office.  Patient stopped taking her hydrochlorothiazide and she was scheduled for an appt with Dr. Blum for 3-10-25.

## 2025-03-10 ENCOUNTER — APPOINTMENT (OUTPATIENT)
Dept: PRIMARY CARE | Facility: CLINIC | Age: 65
End: 2025-03-10
Payer: COMMERCIAL

## 2025-03-10 VITALS
TEMPERATURE: 97.2 F | BODY MASS INDEX: 27.09 KG/M2 | HEART RATE: 59 BPM | SYSTOLIC BLOOD PRESSURE: 128 MMHG | HEIGHT: 60 IN | OXYGEN SATURATION: 98 % | DIASTOLIC BLOOD PRESSURE: 71 MMHG | WEIGHT: 138 LBS

## 2025-03-10 DIAGNOSIS — R79.89 ELEVATED SERUM CREATININE: Primary | ICD-10-CM

## 2025-03-10 DIAGNOSIS — E11.65 TYPE 2 DIABETES MELLITUS WITH HYPERGLYCEMIA, WITHOUT LONG-TERM CURRENT USE OF INSULIN: ICD-10-CM

## 2025-03-10 DIAGNOSIS — I10 BENIGN ESSENTIAL HYPERTENSION: ICD-10-CM

## 2025-03-10 PROCEDURE — 1036F TOBACCO NON-USER: CPT | Performed by: INTERNAL MEDICINE

## 2025-03-10 PROCEDURE — 3008F BODY MASS INDEX DOCD: CPT | Performed by: INTERNAL MEDICINE

## 2025-03-10 PROCEDURE — 99213 OFFICE O/P EST LOW 20 MIN: CPT | Performed by: INTERNAL MEDICINE

## 2025-03-10 PROCEDURE — 3078F DIAST BP <80 MM HG: CPT | Performed by: INTERNAL MEDICINE

## 2025-03-10 PROCEDURE — 3074F SYST BP LT 130 MM HG: CPT | Performed by: INTERNAL MEDICINE

## 2025-03-10 ASSESSMENT — ENCOUNTER SYMPTOMS
CONSTIPATION: 0
BACK PAIN: 0
HEADACHES: 0
VOMITING: 0
DIZZINESS: 0
CHILLS: 0
FATIGUE: 0
DYSURIA: 0
LIGHT-HEADEDNESS: 0
HEMATURIA: 0
NAUSEA: 0
PALPITATIONS: 0
FEVER: 0
SHORTNESS OF BREATH: 0
ARTHRALGIAS: 0
COUGH: 0
DIARRHEA: 0

## 2025-03-10 ASSESSMENT — PATIENT HEALTH QUESTIONNAIRE - PHQ9
1. LITTLE INTEREST OR PLEASURE IN DOING THINGS: NOT AT ALL
2. FEELING DOWN, DEPRESSED OR HOPELESS: NOT AT ALL
SUM OF ALL RESPONSES TO PHQ9 QUESTIONS 1 & 2: 0

## 2025-03-10 NOTE — PROGRESS NOTES
CHIEF COMPLAINT  Follow-up (Review lab results)    HISTORY OF PRESENT ILLNESS  Lanette Hannon is a 64 y.o. female presents today for follow up of Follow-up (Review lab results)    HPI    Recent labs showed an increase in creatinine, 1.2 up to 1.4.  Advised to discontinue hydrochlorothiazide and follow-up in office for recheck.  BP today is good, 128/71.    No new complaints.  Denies frequent use of NSAIDs.  Tries to stay well hydrated.     REVIEW OF SYSTEMS  Review of Systems   Constitutional:  Negative for chills, fatigue and fever.   Respiratory:  Negative for cough and shortness of breath.    Cardiovascular:  Negative for chest pain, palpitations and leg swelling.   Gastrointestinal:  Negative for constipation, diarrhea, nausea and vomiting.   Genitourinary:  Negative for dysuria and hematuria.   Musculoskeletal:  Negative for arthralgias, back pain and gait problem.   Neurological:  Negative for dizziness, light-headedness and headaches.       ALLERGIES  Patient has no known allergies.    MEDICATIONS  Current Outpatient Medications   Medication Instructions    blood-glucose meter (OneTouch Ultra2 Meter) misc Use to check blood sugar.    lancets (OneTouch Delica Plus Lancet) 33 gauge misc Check fasting blood sugar once daily.    lovastatin (MEVACOR) 40 mg, oral, Daily with evening meal    metFORMIN (GLUCOPHAGE) 1,000 mg, oral, 2 times daily (morning and late afternoon)    multivitamin tablet 1 tablet    OneTouch Ultra Test strip Check fasting glucose daily.    Tremfya 100 mg, Every 8 weeks       TOBACCO USE  Social History     Tobacco Use   Smoking Status Never   Smokeless Tobacco Never       DEPRESSION SCREEN  Over the past 2 weeks, how often have you been bothered by any of the following problems?  Little interest or pleasure in doing things: Not at all  Feeling down, depressed, or hopeless: Not at all    SURGICAL HISTORY  Past Surgical History:  01/03/2019: OTHER SURGICAL HISTORY      Comment:  Exploratory  laparoscopy  01/03/2019: OTHER SURGICAL HISTORY      Comment:  Breast biopsy  01/14/2021: OTHER SURGICAL HISTORY      Comment:  Complete colonoscopy       OBJECTIVE    /71   Pulse 59   Temp 36.2 °C (97.2 °F)   Ht 1.524 m (5')   Wt 62.6 kg (138 lb)   SpO2 98%   BMI 26.95 kg/m²    BMI: Estimated body mass index is 26.95 kg/m² as calculated from the following:    Height as of this encounter: 1.524 m (5').    Weight as of this encounter: 62.6 kg (138 lb).    BP Readings from Last 3 Encounters:   03/10/25 128/71   01/09/25 106/66   07/10/24 96/65      Wt Readings from Last 3 Encounters:   03/10/25 62.6 kg (138 lb)   01/09/25 61.8 kg (136 lb 3.2 oz)   12/20/24 60.8 kg (134 lb 0.6 oz)          PHYSICAL EXAM  Physical Exam  Constitutional:       Appearance: Normal appearance.   HENT:      Head: Normocephalic and atraumatic.   Cardiovascular:      Rate and Rhythm: Normal rate and regular rhythm.   Pulmonary:      Effort: Pulmonary effort is normal. No respiratory distress.      Breath sounds: Normal breath sounds. No wheezing.   Musculoskeletal:      Right lower leg: No edema.      Left lower leg: No edema.   Neurological:      General: No focal deficit present.      Mental Status: She is alert and oriented to person, place, and time. Mental status is at baseline.   Psychiatric:         Mood and Affect: Mood normal.         Behavior: Behavior normal.         Thought Content: Thought content normal.         Judgment: Judgment normal.          ASSESSMENT AND PLAN  Assessment/Plan   Problem List Items Addressed This Visit       Benign essential hypertension    Relevant Orders    Basic metabolic panel    Urinalysis with Reflex Microscopic    Albumin-Creatinine Ratio, Urine Random    Type 2 diabetes mellitus with hyperglycemia, without long-term current use of insulin    Relevant Orders    Basic metabolic panel    Urinalysis with Reflex Microscopic    Albumin-Creatinine Ratio, Urine Random     Other Visit Diagnoses        Elevated serum creatinine    -  Primary    Relevant Orders    Basic metabolic panel    Urinalysis with Reflex Microscopic    Albumin-Creatinine Ratio, Urine Random          Elevated creatinine, with hypertension and DM2 - recently stopped hydrochlorothiazide.  BP is in acceptable range.  Denies frequent use of NSAIDs.  Plan to repeat BMP, will also check urinalysis and urine albumin/creatinine.  Try to check BP regularly at home and follow-up if persistently elevated.  Follow-up as scheduled, sooner pending results.

## 2025-03-31 ENCOUNTER — TELEPHONE (OUTPATIENT)
Dept: PRIMARY CARE | Facility: CLINIC | Age: 65
End: 2025-03-31
Payer: COMMERCIAL

## 2025-03-31 DIAGNOSIS — N20.0 KIDNEY STONES: Primary | ICD-10-CM

## 2025-03-31 RX ORDER — TAMSULOSIN HYDROCHLORIDE 0.4 MG/1
0.4 CAPSULE ORAL 2 TIMES DAILY
Qty: 28 CAPSULE | Refills: 0 | Status: SHIPPED | OUTPATIENT
Start: 2025-03-31 | End: 2025-04-14

## 2025-03-31 NOTE — TELEPHONE ENCOUNTER
Pt called, she is passing a kidney stone. She doesn't want to go to the ER. She is asking if you can send something in for the pain. She said she has to work and just wants something to make it more bearable.     Please advise.       [facility] #[id number] - [place], [place] - [address] AT [address]CTION OF [place] [id number] [address]  [facility] [address]  Phone: [phone] Fax: [phone]

## 2025-04-01 ENCOUNTER — TELEPHONE (OUTPATIENT)
Dept: PRIMARY CARE | Facility: CLINIC | Age: 65
End: 2025-04-01
Payer: COMMERCIAL

## 2025-04-01 NOTE — TELEPHONE ENCOUNTER
Pt is calling to say that she is still vomiting, has chills.  Still has discomfort.  Pain is left lower side.  Tylenol did work initially but no so good now.  Pt had a really rough night.  Does not have a thermometer to see if she has a fever.   Pt has been drinking a lot/urinating a lot.  When it first started urine was dark, now lighter.   Pt would like advice on what to do.    Pt is going out of town on Saturday.    Pt ph:  [phone]

## 2025-04-26 LAB
ANION GAP SERPL CALCULATED.4IONS-SCNC: 13 MMOL/L (CALC) (ref 7–17)
APPEARANCE UR: CLEAR
BACTERIA #/AREA URNS HPF: ABNORMAL /HPF
BILIRUB UR QL STRIP: NEGATIVE
BUN SERPL-MCNC: 22 MG/DL (ref 7–25)
BUN/CREAT SERPL: 18 (CALC) (ref 6–22)
CALCIUM SERPL-MCNC: 9.5 MG/DL (ref 8.6–10.4)
CHLORIDE SERPL-SCNC: 105 MMOL/L (ref 98–110)
CO2 SERPL-SCNC: 21 MMOL/L (ref 20–32)
COLOR UR: YELLOW
CREAT SERPL-MCNC: 1.22 MG/DL (ref 0.5–1.05)
EGFRCR SERPLBLD CKD-EPI 2021: 50 ML/MIN/1.73M2
GLUCOSE SERPL-MCNC: 109 MG/DL (ref 65–99)
GLUCOSE UR QL STRIP: NEGATIVE
HGB UR QL STRIP: ABNORMAL
HYALINE CASTS #/AREA URNS LPF: ABNORMAL /LPF
KETONES UR QL STRIP: NEGATIVE
LEUKOCYTE ESTERASE UR QL STRIP: ABNORMAL
NITRITE UR QL STRIP: NEGATIVE
PH UR STRIP: 5.5 [PH] (ref 5–8)
POTASSIUM SERPL-SCNC: 5 MMOL/L (ref 3.5–5.3)
PROT UR QL STRIP: NEGATIVE
RBC #/AREA URNS HPF: ABNORMAL /HPF
SERVICE CMNT-IMP: ABNORMAL
SODIUM SERPL-SCNC: 139 MMOL/L (ref 135–146)
SP GR UR STRIP: 1.02 (ref 1–1.03)
SQUAMOUS #/AREA URNS HPF: ABNORMAL /HPF
WBC #/AREA URNS HPF: ABNORMAL /HPF

## 2025-07-01 ENCOUNTER — TELEPHONE (OUTPATIENT)
Dept: RHEUMATOLOGY | Facility: CLINIC | Age: 65
End: 2025-07-01
Payer: COMMERCIAL

## 2025-07-01 NOTE — TELEPHONE ENCOUNTER
Pt called, is out of town right now.  Has a c/o Rt shoulder/nerve pain.   She is asking if she should make an OV for when she comes back?  Also, what can you suggest for relief in the meantime?    Please advise.

## 2025-07-10 ENCOUNTER — APPOINTMENT (OUTPATIENT)
Dept: PRIMARY CARE | Facility: CLINIC | Age: 65
End: 2025-07-10
Payer: COMMERCIAL

## 2025-07-10 VITALS
HEIGHT: 60 IN | HEART RATE: 69 BPM | DIASTOLIC BLOOD PRESSURE: 67 MMHG | SYSTOLIC BLOOD PRESSURE: 132 MMHG | OXYGEN SATURATION: 98 % | TEMPERATURE: 97.4 F | WEIGHT: 133.6 LBS | BODY MASS INDEX: 26.23 KG/M2

## 2025-07-10 DIAGNOSIS — Z00.00 HEALTHCARE MAINTENANCE: Primary | ICD-10-CM

## 2025-07-10 DIAGNOSIS — S46.811D STRAIN OF RIGHT TRAPEZIUS MUSCLE, SUBSEQUENT ENCOUNTER: ICD-10-CM

## 2025-07-10 DIAGNOSIS — I10 BENIGN ESSENTIAL HYPERTENSION: ICD-10-CM

## 2025-07-10 DIAGNOSIS — E11.65 TYPE 2 DIABETES MELLITUS WITH HYPERGLYCEMIA, WITHOUT LONG-TERM CURRENT USE OF INSULIN: ICD-10-CM

## 2025-07-10 DIAGNOSIS — E78.2 MIXED HYPERLIPIDEMIA: Chronic | ICD-10-CM

## 2025-07-10 PROCEDURE — 90750 HZV VACC RECOMBINANT IM: CPT | Performed by: INTERNAL MEDICINE

## 2025-07-10 PROCEDURE — 90471 IMMUNIZATION ADMIN: CPT | Performed by: INTERNAL MEDICINE

## 2025-07-10 PROCEDURE — 3008F BODY MASS INDEX DOCD: CPT | Performed by: INTERNAL MEDICINE

## 2025-07-10 PROCEDURE — 3078F DIAST BP <80 MM HG: CPT | Performed by: INTERNAL MEDICINE

## 2025-07-10 PROCEDURE — 99396 PREV VISIT EST AGE 40-64: CPT | Performed by: INTERNAL MEDICINE

## 2025-07-10 PROCEDURE — 3075F SYST BP GE 130 - 139MM HG: CPT | Performed by: INTERNAL MEDICINE

## 2025-07-10 NOTE — PROGRESS NOTES
CHIEF COMPLAINT  Hypertension, Physical (C/O Rt side neck pain w/ radiation down arm to hand. Ongoing since she fell a month ago at home. ), and Diabetes    HISTORY OF PRESENT ILLNESS  Lanette Hannon is a 64 y.o. female presents today for follow up of Hypertension, Physical (C/O Rt side neck pain w/ radiation down arm to hand. Ongoing since she fell a month ago at home. ), and Diabetes    HPI    Past Medical, Surgical, and Family History reviewed and updated in chart.  Reviewed all medications by prescribing practitioner or clinical pharmacist (such as prescriptions, OTCs, herbal therapies and supplements) and documented in the medical record.    Pain in right side of neck / shoulder - had done some exercises online and it did help, but then the pain returned.  Had a fall - was not severe, but seemed to make the neck / shoulder pain worse.  Has tried Tylenol, Biofreeze, Salon Pas - nothing really helped.  Flared up on vacation.  Now comes and goes.  Better when using arm.  Worse at night, tends to sleep in different positions.   No numbness / tingling in arm.     Due for fasting labs.      REVIEW OF SYSTEMS  Review of Systems   Constitutional:  Negative for chills, fatigue and fever.   HENT:  Negative for sore throat.    Respiratory:  Negative for cough and shortness of breath.    Cardiovascular:  Negative for chest pain, palpitations and leg swelling.   Gastrointestinal:  Negative for constipation, diarrhea, nausea and vomiting.   Genitourinary:  Negative for dysuria and hematuria.   Musculoskeletal:  Positive for myalgias.   Skin:  Negative for rash.   Neurological:  Negative for dizziness, light-headedness, numbness and headaches.   Psychiatric/Behavioral:  Negative for confusion.        ALLERGIES  Patient has no known allergies.    MEDICATIONS  Current Outpatient Medications   Medication Instructions    blood-glucose meter (OneTouch Ultra2 Meter) misc Use to check blood sugar.    lancets (OneTouch Delica Plus  Lancet) 33 gauge misc Check fasting blood sugar once daily.    lovastatin (MEVACOR) 40 mg, oral, Daily with evening meal    metFORMIN (GLUCOPHAGE) 1,000 mg, oral, 2 times daily (morning and late afternoon)    multivitamin tablet 1 tablet    OneTouch Ultra Test strip Check fasting glucose daily.    Tremfya 100 mg, Every 8 weeks       TOBACCO USE  Tobacco Use History[1]    DEPRESSION SCREEN  Over the past 2 weeks, how often have you been bothered by any of the following problems?  Little interest or pleasure in doing things: Not at all  Feeling down, depressed, or hopeless: Not at all    SURGICAL HISTORY  Past Surgical History:  01/03/2019: OTHER SURGICAL HISTORY      Comment:  Exploratory laparoscopy  01/03/2019: OTHER SURGICAL HISTORY      Comment:  Breast biopsy  01/14/2021: OTHER SURGICAL HISTORY      Comment:  Complete colonoscopy       OBJECTIVE    /67   Pulse 69   Temp 36.3 °C (97.4 °F)   Ht (!) 1.524 m (5')   Wt 60.6 kg (133 lb 9.6 oz)   SpO2 98%   BMI 26.09 kg/m²    BMI: Estimated body mass index is 26.09 kg/m² as calculated from the following:    Height as of this encounter: 1.524 m (5').    Weight as of this encounter: 60.6 kg (133 lb 9.6 oz).    BP Readings from Last 3 Encounters:   07/10/25 132/67   03/10/25 128/71   01/09/25 106/66      Wt Readings from Last 3 Encounters:   07/10/25 60.6 kg (133 lb 9.6 oz)   03/10/25 62.6 kg (138 lb)   01/09/25 61.8 kg (136 lb 3.2 oz)        PHYSICAL EXAM  Physical Exam  Constitutional:       Appearance: Normal appearance.   HENT:      Head: Normocephalic and atraumatic.      Right Ear: Tympanic membrane and ear canal normal.      Left Ear: Tympanic membrane and ear canal normal.   Neck:      Vascular: No carotid bruit.   Cardiovascular:      Rate and Rhythm: Normal rate and regular rhythm.      Heart sounds: No murmur heard.  Pulmonary:      Effort: Pulmonary effort is normal. No respiratory distress.      Breath sounds: Normal breath sounds. No wheezing.    Abdominal:      General: There is no distension.      Palpations: Abdomen is soft.      Tenderness: There is no abdominal tenderness.   Musculoskeletal:      Right lower leg: No edema.      Left lower leg: No edema.      Comments: No cervical spinal tenderness  + tenderness right upper trap  Good ROM in shoulders, strength normal in upper arms.   Neurological:      General: No focal deficit present.      Mental Status: She is alert and oriented to person, place, and time. Mental status is at baseline.   Psychiatric:         Mood and Affect: Mood normal.         Behavior: Behavior normal.         Thought Content: Thought content normal.         Judgment: Judgment normal.          ASSESSMENT AND PLAN  Assessment/Plan   Problem List Items Addressed This Visit       Benign essential hypertension    Mixed hyperlipidemia (Chronic)    Type 2 diabetes mellitus with hyperglycemia, without long-term current use of insulin    Relevant Orders    Hemoglobin A1C    Albumin-Creatinine Ratio, Urine Random    Urinalysis with Reflex Microscopic    Strain of right trapezius muscle     Other Visit Diagnoses         Healthcare maintenance    -  Primary    Relevant Orders    Lipid Panel    Comprehensive Metabolic Panel    CBC and Auto Differential          Well Visit    DM2 - check A1c.  - urine albumin / creatinine ratio, BUN, Cr, GFR ordered  - continue metformin  - on statin     BP is acceptable.  Heart healthy habits recommended. Aim for 30 minutes of aerobic exercise, 5 times per week. Try to cut back on processed foods, including foods made with flour, sugar, and added salt.      Mixed hyperlipidemia - on statin, check lipid panel.     Right trapezius strain - continue home exercise program and she will consider formal PT.     Mammogram 12/24/21, 11/10/23, 12/20/24.      Pap July 2019, due.     Colonoscopy 2014, 8/2/23 - repeat 10 years.     Reviewed signs of skin cancer and importance of sun protection.  Follows with  dermatologist for psoriasis.     Continue to stay current with routine dental and eye exams.      Shingrix #1 today.    Follow-up 6 months, sooner pending results.  Can do Shingrix #2 in 6 months.            [1]   Social History  Tobacco Use   Smoking Status Never   Smokeless Tobacco Never

## 2025-07-11 PROBLEM — S46.811A STRAIN OF RIGHT TRAPEZIUS MUSCLE: Status: ACTIVE | Noted: 2025-07-11

## 2025-07-11 LAB
ALBUMIN SERPL-MCNC: 4.9 G/DL (ref 3.6–5.1)
ALP SERPL-CCNC: 52 U/L (ref 37–153)
ALT SERPL-CCNC: 14 U/L (ref 6–29)
ANION GAP SERPL CALCULATED.4IONS-SCNC: 9 MMOL/L (CALC) (ref 7–17)
AST SERPL-CCNC: 11 U/L (ref 10–35)
BASOPHILS # BLD AUTO: 51 CELLS/UL (ref 0–200)
BASOPHILS NFR BLD AUTO: 0.8 %
BILIRUB SERPL-MCNC: 0.4 MG/DL (ref 0.2–1.2)
BUN SERPL-MCNC: 17 MG/DL (ref 7–25)
CALCIUM SERPL-MCNC: 10.1 MG/DL (ref 8.6–10.4)
CHLORIDE SERPL-SCNC: 105 MMOL/L (ref 98–110)
CHOLEST SERPL-MCNC: 168 MG/DL
CHOLEST/HDLC SERPL: 3.9 (CALC)
CO2 SERPL-SCNC: 27 MMOL/L (ref 20–32)
CREAT SERPL-MCNC: 1.18 MG/DL (ref 0.5–1.05)
EGFRCR SERPLBLD CKD-EPI 2021: 52 ML/MIN/1.73M2
EOSINOPHIL # BLD AUTO: 58 CELLS/UL (ref 15–500)
EOSINOPHIL NFR BLD AUTO: 0.9 %
ERYTHROCYTE [DISTWIDTH] IN BLOOD BY AUTOMATED COUNT: 13.3 % (ref 11–15)
EST. AVERAGE GLUCOSE BLD GHB EST-MCNC: 140 MG/DL
EST. AVERAGE GLUCOSE BLD GHB EST-SCNC: 7.7 MMOL/L
GLUCOSE SERPL-MCNC: 108 MG/DL (ref 65–99)
HBA1C MFR BLD: 6.5 %
HCT VFR BLD AUTO: 39.4 % (ref 35–45)
HDLC SERPL-MCNC: 43 MG/DL
HGB BLD-MCNC: 13 G/DL (ref 11.7–15.5)
LDLC SERPL CALC-MCNC: 96 MG/DL (CALC)
LYMPHOCYTES # BLD AUTO: 2445 CELLS/UL (ref 850–3900)
LYMPHOCYTES NFR BLD AUTO: 38.2 %
MCH RBC QN AUTO: 29.3 PG (ref 27–33)
MCHC RBC AUTO-ENTMCNC: 33 G/DL (ref 32–36)
MCV RBC AUTO: 88.9 FL (ref 80–100)
MONOCYTES # BLD AUTO: 410 CELLS/UL (ref 200–950)
MONOCYTES NFR BLD AUTO: 6.4 %
NEUTROPHILS # BLD AUTO: 3437 CELLS/UL (ref 1500–7800)
NEUTROPHILS NFR BLD AUTO: 53.7 %
NONHDLC SERPL-MCNC: 125 MG/DL (CALC)
PLATELET # BLD AUTO: 380 THOUSAND/UL (ref 140–400)
PMV BLD REES-ECKER: 8.4 FL (ref 7.5–12.5)
POTASSIUM SERPL-SCNC: 4.7 MMOL/L (ref 3.5–5.3)
PROT SERPL-MCNC: 7.6 G/DL (ref 6.1–8.1)
RBC # BLD AUTO: 4.43 MILLION/UL (ref 3.8–5.1)
SODIUM SERPL-SCNC: 141 MMOL/L (ref 135–146)
TRIGL SERPL-MCNC: 191 MG/DL
WBC # BLD AUTO: 6.4 THOUSAND/UL (ref 3.8–10.8)

## 2025-07-11 ASSESSMENT — ENCOUNTER SYMPTOMS
FEVER: 0
DYSURIA: 0
SORE THROAT: 0
NAUSEA: 0
MYALGIAS: 1
CHILLS: 0
LIGHT-HEADEDNESS: 0
HEADACHES: 0
CONSTIPATION: 0
NUMBNESS: 0
DIZZINESS: 0
CONFUSION: 0
DIARRHEA: 0
HEMATURIA: 0
COUGH: 0
FATIGUE: 0
PALPITATIONS: 0
SHORTNESS OF BREATH: 0
VOMITING: 0

## 2025-07-18 LAB
ALBUMIN/CREAT UR: 15 MG/G CREAT
APPEARANCE UR: CLEAR
BACTERIA #/AREA URNS HPF: ABNORMAL /HPF
BILIRUB UR QL STRIP: NEGATIVE
COLOR UR: YELLOW
CREAT UR-MCNC: 71 MG/DL (ref 20–275)
GLUCOSE UR QL STRIP: NEGATIVE
HGB UR QL STRIP: ABNORMAL
HYALINE CASTS #/AREA URNS LPF: ABNORMAL /LPF
KETONES UR QL STRIP: NEGATIVE
LEUKOCYTE ESTERASE UR QL STRIP: NEGATIVE
MICROALBUMIN UR-MCNC: 1.1 MG/DL
NITRITE UR QL STRIP: NEGATIVE
PH UR STRIP: ABNORMAL [PH] (ref 5–8)
PROT UR QL STRIP: NEGATIVE
RBC #/AREA URNS HPF: ABNORMAL /HPF
SERVICE CMNT-IMP: ABNORMAL
SP GR UR STRIP: 1.02 (ref 1–1.03)
SQUAMOUS #/AREA URNS HPF: ABNORMAL /HPF
WBC #/AREA URNS HPF: ABNORMAL /HPF

## 2026-01-12 ENCOUNTER — APPOINTMENT (OUTPATIENT)
Dept: PRIMARY CARE | Facility: CLINIC | Age: 66
End: 2026-01-12
Payer: COMMERCIAL